# Patient Record
Sex: FEMALE | Race: WHITE
[De-identification: names, ages, dates, MRNs, and addresses within clinical notes are randomized per-mention and may not be internally consistent; named-entity substitution may affect disease eponyms.]

---

## 2022-10-05 PROBLEM — Z00.00 ENCOUNTER FOR PREVENTIVE HEALTH EXAMINATION: Status: ACTIVE | Noted: 2022-10-05

## 2022-10-07 ENCOUNTER — APPOINTMENT (OUTPATIENT)
Age: 67
End: 2022-10-07

## 2022-10-07 VITALS
SYSTOLIC BLOOD PRESSURE: 130 MMHG | WEIGHT: 160 LBS | HEIGHT: 62 IN | BODY MASS INDEX: 29.44 KG/M2 | DIASTOLIC BLOOD PRESSURE: 80 MMHG | HEART RATE: 88 BPM | OXYGEN SATURATION: 97 %

## 2022-10-07 DIAGNOSIS — J32.9 CHRONIC SINUSITIS, UNSPECIFIED: ICD-10-CM

## 2022-10-07 DIAGNOSIS — Z86.39 PERSONAL HISTORY OF OTHER ENDOCRINE, NUTRITIONAL AND METABOLIC DISEASE: ICD-10-CM

## 2022-10-07 DIAGNOSIS — Z86.79 PERSONAL HISTORY OF OTHER DISEASES OF THE CIRCULATORY SYSTEM: ICD-10-CM

## 2022-10-07 DIAGNOSIS — J15.9 UNSPECIFIED BACTERIAL PNEUMONIA: ICD-10-CM

## 2022-10-07 PROCEDURE — 99204 OFFICE O/P NEW MOD 45 MIN: CPT

## 2022-10-07 PROCEDURE — 99072 ADDL SUPL MATRL&STAF TM PHE: CPT

## 2022-10-07 NOTE — REASON FOR VISIT
[Initial] : an initial visit [Pneumonia] : pneumonia [Cough] : cough [Shortness of Breath] : shortness of breath [TextBox_44] : The patient is a teacher and works in the classroom.  She states that starting around September 18 she began developing nasal congestion chills and fevers.  She went to the physician and was given a Z-Sage.  Her condition continued to worsen.  It seems to be predominantly centered around her nose and sinuses.  Eventually she went to Dr. Zion SPICER.  She did a laryngoscopy and told her there was a tremendous amount of secretions coming from her sinuses.  She placed her on a 21-day course of levofloxacin and sent her for a chest x-ray.  The patient states that the chest x-ray demonstrated a left lower lobe pneumonia.  I have an x-ray from [facility] which confirms this.\par \par The patient also had flu and COVID testing which was negative.  Patient states that after a few days the amount of secretions being produced were decreased.  The cough reverted to a dry cough.  However she still feels ill with a heaviness in her chest.\par \par Complicating this situation is the fact that the patient is having significant construction in her house which involves a tremendous amount of fumes and dust.  She returns home every night to these findings.

## 2022-10-07 NOTE — HISTORY OF PRESENT ILLNESS
[TextBox_4] : I reviewed and interpreted any  OP CXR or CT available in the files\par I discussed the results today with the patient.\par

## 2022-10-07 NOTE — ASSESSMENT
[FreeTextEntry1] : It appears that the patient may have had an acute sinusitis.  This is being complicated by the construction in her house as well as possible seasonal issues.  Given the mucus production it was likely infectious.  She may have aspirated some of the secretions triggering the pneumonia.\par Currently the patient seems to be doing somewhat better given the decreased secretions and the dry cough.\par From a pulmonary standpoint she needs at least 7 days of antibiotics.  However I am aware that chronic sinus infections are treated with much longer courses.\par She needs to be aggressive with nasal saline and washing out her nose at least 3 times a day.  She is going to continue with her Flonase.\par She will follow-up with ENT.\par She is going to see me in about 1 month's time and we will repeat her x-ray.  We will then speak further about possible screening CAT scans etc. based on her smoking history.  If there are any issues in the interim she is going to speak with me.

## 2022-11-03 ENCOUNTER — APPOINTMENT (OUTPATIENT)
Age: 67
End: 2022-11-03